# Patient Record
Sex: FEMALE | Race: WHITE | NOT HISPANIC OR LATINO | URBAN - METROPOLITAN AREA
[De-identification: names, ages, dates, MRNs, and addresses within clinical notes are randomized per-mention and may not be internally consistent; named-entity substitution may affect disease eponyms.]

---

## 2022-09-03 ENCOUNTER — APPOINTMENT (OUTPATIENT)
Dept: GENERAL RADIOLOGY | Facility: HOSPITAL | Age: 32
End: 2022-09-03

## 2022-09-03 ENCOUNTER — HOSPITAL ENCOUNTER (EMERGENCY)
Facility: HOSPITAL | Age: 32
Discharge: HOME | End: 2022-09-03
Attending: STUDENT IN AN ORGANIZED HEALTH CARE EDUCATION/TRAINING PROGRAM | Admitting: STUDENT IN AN ORGANIZED HEALTH CARE EDUCATION/TRAINING PROGRAM

## 2022-09-03 VITALS
WEIGHT: 121.47 LBS | HEART RATE: 101 BPM | OXYGEN SATURATION: 100 % | DIASTOLIC BLOOD PRESSURE: 94 MMHG | SYSTOLIC BLOOD PRESSURE: 121 MMHG | HEIGHT: 65 IN | TEMPERATURE: 98.4 F | BODY MASS INDEX: 20.24 KG/M2 | RESPIRATION RATE: 16 BRPM

## 2022-09-03 DIAGNOSIS — S60.559A: Primary | ICD-10-CM

## 2022-09-03 PROCEDURE — 90471 IMMUNIZATION ADMIN: CPT | Performed by: STUDENT IN AN ORGANIZED HEALTH CARE EDUCATION/TRAINING PROGRAM

## 2022-09-03 PROCEDURE — 99283 EMERGENCY DEPT VISIT LOW MDM: CPT

## 2022-09-03 PROCEDURE — 25010000002 TETANUS-DIPHTH-ACELL PERTUSSIS 5-2.5-18.5 LF-MCG/0.5 SUSPENSION PREFILLED SYRINGE: Performed by: STUDENT IN AN ORGANIZED HEALTH CARE EDUCATION/TRAINING PROGRAM

## 2022-09-03 PROCEDURE — 90715 TDAP VACCINE 7 YRS/> IM: CPT | Performed by: STUDENT IN AN ORGANIZED HEALTH CARE EDUCATION/TRAINING PROGRAM

## 2022-09-03 PROCEDURE — 73130 X-RAY EXAM OF HAND: CPT

## 2022-09-03 RX ORDER — CEPHALEXIN 500 MG/1
500 CAPSULE ORAL 4 TIMES DAILY
Qty: 28 CAPSULE | Refills: 0 | Status: SHIPPED | OUTPATIENT
Start: 2022-09-03 | End: 2022-09-10

## 2022-09-03 RX ORDER — DEXTROAMPHETAMINE SACCHARATE, AMPHETAMINE ASPARTATE, DEXTROAMPHETAMINE SULFATE AND AMPHETAMINE SULFATE 5; 5; 5; 5 MG/1; MG/1; MG/1; MG/1
TABLET ORAL
COMMUNITY

## 2022-09-03 RX ADMIN — TETANUS TOXOID, REDUCED DIPHTHERIA TOXOID AND ACELLULAR PERTUSSIS VACCINE, ADSORBED 0.5 ML: 5; 2.5; 8; 8; 2.5 SUSPENSION INTRAMUSCULAR at 18:37

## 2022-09-03 NOTE — DISCHARGE INSTRUCTIONS
Please take antibiotics as prescribed until finished  Please keep area clean and dry  Please keep covered if going to be working outside

## 2022-09-03 NOTE — ED PROVIDER NOTES
Subjective   Patient is a 32-year-old female presenting today due to a splinter on the plantar side of the right hand.  Patient states that she was wiping after off a deck table and a splinter went through her hand.  Patient states that the wound was treated wood for outdoors.  She has not taken any analgesics PTA.  Patient states that she tried removing the splinter prior to arrival with no success.  She is up-to-date on immunizations but not tetanus.        History provided by:  Patient   used: No        Review of Systems   Constitutional: Negative.    HENT: Negative.    Eyes: Negative.    Respiratory: Negative.    Cardiovascular: Negative.    Gastrointestinal: Negative.    Endocrine: Negative.    Genitourinary: Negative.    Musculoskeletal: Negative.    Skin: Positive for wound.   Allergic/Immunologic: Negative.    Neurological: Negative.    Hematological: Negative.    Psychiatric/Behavioral: Negative.        Past Medical History:   Diagnosis Date   • ADHD        Allergies   Allergen Reactions   • Amoxicillin-Pot Clavulanate Nausea And Vomiting       Past Surgical History:   Procedure Laterality Date   • CHOLECYSTECTOMY     • DILATATION AND CURETTAGE     • KNEE CARTILAGE SURGERY Bilateral        History reviewed. No pertinent family history.    Social History     Socioeconomic History   • Marital status:    Tobacco Use   • Smoking status: Never Smoker   • Smokeless tobacco: Never Used   Vaping Use   • Vaping Use: Never used   Substance and Sexual Activity   • Alcohol use: Never   • Drug use: Never           Objective   Physical Exam  Vitals and nursing note reviewed.   Constitutional:       General: She is in acute distress.      Appearance: Normal appearance. She is normal weight.   HENT:      Head: Normocephalic and atraumatic.      Nose: Nose normal.      Mouth/Throat:      Mouth: Mucous membranes are moist.   Eyes:      Extraocular Movements: Extraocular movements intact.       Conjunctiva/sclera: Conjunctivae normal.      Pupils: Pupils are equal, round, and reactive to light.   Cardiovascular:      Rate and Rhythm: Normal rate and regular rhythm.      Heart sounds: Normal heart sounds.   Pulmonary:      Effort: Pulmonary effort is normal.      Breath sounds: Normal breath sounds.   Abdominal:      Palpations: Abdomen is soft.   Musculoskeletal:         General: Tenderness and signs of injury present. Normal range of motion.      Cervical back: Normal range of motion and neck supple.   Skin:     General: Skin is warm and dry.      Capillary Refill: Capillary refill takes 2 to 3 seconds.      Comments: puncture to dorsum of right hand   Neurological:      General: No focal deficit present.      Mental Status: She is alert and oriented to person, place, and time.   Psychiatric:         Mood and Affect: Mood normal.         Behavior: Behavior normal.         Procedures           ED Course  ED Course as of 09/03/22 2010   Sat Sep 03, 2022   1944 1 cm wooden splinter removed from the dorsum of the right hand with splinter removal forceps.  Patient's hand was numbed with lidocaine prior to procedure, cleaned with saline in chlorhexidine.    After splinter was removed flushed with normal saline.  [AJ]      ED Course User Index  [AJ] Masood Nicolas, GRACE                                           MDM  Number of Diagnoses or Management Options  Wood splinter in dorsum of hand  Diagnosis management comments: I have spoken with patient. I have explained the patient´s condition, diagnoses and treatment plan based on the information available to me at this time. I have answered the patient's questions and addressed any concerns. The patient has a good  understanding of the patient´s diagnosis, condition, and treatment plan as can be expected at this point. The vital signs have been stable. The patient´s condition is stable and appropriate for discharge from the emergency department.      The  patient will pursue further outpatient evaluation with the primary care physician or other designated or consulting physician as outlined in the discharge instructions. They are agreeable to this plan of care and follow-up instructions have been explained in detail. The patient has received these instructions in written format and have expressed an understanding of the discharge instructions. The patient is aware that any significant change in condition or worsening of symptoms should prompt an immediate return to this or the closest emergency department or call to 911.         Amount and/or Complexity of Data Reviewed  Tests in the radiology section of CPT®: reviewed    Risk of Complications, Morbidity, and/or Mortality  Presenting problems: low  Diagnostic procedures: low  Management options: low    Patient Progress  Patient progress: stable      Final diagnoses:   Wood splinter in dorsum of hand       ED Disposition  ED Disposition     ED Disposition   Discharge    Condition   Stable    Comment   --             Provider, No Known  [facility]  [place] [address]      If symptoms worsen         Medication List      New Prescriptions    cephalexin 500 MG capsule  Commonly known as: KEFLEX  Take 1 capsule by mouth 4 (Four) Times a Day for 7 days.           Where to Get Your Medications      These medications were sent to [id number] - [place] - [address] AT [facility] - [phone]  - [phone]  [address] [address]    Phone: [phone]   · cephalexin 500 MG capsule          Masood Nicolas PA-C  09/03/22 2010

## 2024-09-11 ENCOUNTER — E-VISIT (OUTPATIENT)
Dept: ADMINISTRATIVE | Facility: OTHER | Age: 34
End: 2024-09-11

## 2024-09-11 NOTE — E-VISIT ESCALATED
"Status: Referred Out  Date: 2024 06:52:04  Acuity Level: Within 24 hours  Referral message:  We're sorry you are not feeling well. Your safety is important to us. Based on your symptoms and the information you have provided, it is possible that you may have strep throat. Further testing is required to determine if you have   strep throat.  For the most appropriate care, please be seen:   At a clinic or an urgent care  Within 24 hours   You won't be charged for this visit. We hope you feel better soon!   Patient: Maris Hall  Patient : 1990  Patient Address: [address], [place] [address]  Patient Phone: [phone]  Clinician Response: Unavailable  Diagnosis: Unavailable  Diagnosis ICD: Unavailable     Patient Interview Questions and Responses:  Clinical Protocol: URI  Please select the reason for your visit today.: Cold, sinus infection, or flu  Please select when your first symptom started.: 1-2 days ago  Knowing exactly when the symptoms started will help the provider diagnose and treat the condition appropriately.Please enter the date your symptoms started (MM/DD/YYYY). If you do not know the exact date, please enter \"unknown\".: 2024  Do you currently have these symptoms? Nasal congestion (stuffy nose): Yes  Do you currently have these symptoms? Runny nose: No  Do you currently have these symptoms? Cough: No  Do you currently have these symptoms? Sore throat: Yes  Color helps provide description to the provider but it does not help decide if the condition is viral or bacterial or if antibiotics will be prescribed.What color is your mucus? Select all that apply. Clear: No  Color helps provide description to the provider but it does not help decide if the condition is viral or bacterial or if antibiotics will be prescribed.What color is your mucus? Select all that apply. White: No  Color helps provide description to the provider but it does not help decide if the condition is viral or " bacterial or if antibiotics will be prescribed.What color is your mucus? Select all that apply. Yellow: Yes  Color helps provide description to the provider but it does not help decide if the condition is viral or bacterial or if antibiotics will be prescribed.What color is your mucus? Select all that apply. Green: No  Color helps provide description to the provider but it does not help decide if the condition is viral or bacterial or if antibiotics will be prescribed.What color is your mucus? Select all that apply. Blood-tinged: No  Color helps provide description to the provider but it does not help decide if the condition is viral or bacterial or if antibiotics will be prescribed.What color is your mucus? Select all that apply. Bloody: No  Please rate the severity of your throat pain on a pain scale, with 0 being no pain and 10 being the worst pain imaginable.: 4-6 = Moderate pain  Being unable to swallow any liquids, including one's own saliva, may indicate a very serious condition called epiglottitis. Please be seen in the emergency room if liquids cannot be swallowed. Can you swallow liquids?: Yes, I can swallow liquids with   mild discomfort  Please feel your neck. Are the lymph nodes in the neck enlarged?: Yes  Is the lymph node swelling worse on one side?: No  Has the swelling caused any changes in your speech or hoarseness in your voice?: Yes  Do you have white spots on the back of the throat?: Yes

## 2025-08-25 ENCOUNTER — OFFICE VISIT (OUTPATIENT)
Dept: ORTHOPEDIC SURGERY | Facility: CLINIC | Age: 35
End: 2025-08-25
Payer: COMMERCIAL

## 2025-08-25 VITALS
OXYGEN SATURATION: 98 % | SYSTOLIC BLOOD PRESSURE: 122 MMHG | BODY MASS INDEX: 20.16 KG/M2 | HEART RATE: 76 BPM | HEIGHT: 65 IN | WEIGHT: 121 LBS | DIASTOLIC BLOOD PRESSURE: 80 MMHG

## 2025-08-25 DIAGNOSIS — S13.4XXA WHIPLASH INJURY TO NECK, INITIAL ENCOUNTER: ICD-10-CM

## 2025-08-25 DIAGNOSIS — M25.512 LEFT SHOULDER PAIN, UNSPECIFIED CHRONICITY: Primary | ICD-10-CM

## 2025-08-25 RX ORDER — DICLOFENAC SODIUM 75 MG/1
75 TABLET, DELAYED RELEASE ORAL 2 TIMES DAILY
Qty: 60 TABLET | Refills: 0 | Status: SHIPPED | OUTPATIENT
Start: 2025-08-25